# Patient Record
Sex: MALE | Race: WHITE | URBAN - METROPOLITAN AREA
[De-identification: names, ages, dates, MRNs, and addresses within clinical notes are randomized per-mention and may not be internally consistent; named-entity substitution may affect disease eponyms.]

---

## 2021-06-29 ENCOUNTER — HOSPITAL ENCOUNTER (OUTPATIENT)
Facility: CLINIC | Age: 56
End: 2021-06-29
Attending: OPHTHALMOLOGY | Admitting: OPHTHALMOLOGY
Payer: OTHER MISCELLANEOUS

## 2021-06-29 ENCOUNTER — HOSPITAL ENCOUNTER (EMERGENCY)
Facility: CLINIC | Age: 56
End: 2021-06-29

## 2021-06-29 ENCOUNTER — HOSPITAL ENCOUNTER (OUTPATIENT)
Facility: CLINIC | Age: 56
Discharge: HOME | End: 2021-06-30
Attending: OPHTHALMOLOGY | Admitting: OPHTHALMOLOGY
Payer: OTHER MISCELLANEOUS

## 2021-06-29 ENCOUNTER — ANESTHESIA EVENT (OUTPATIENT)
Dept: SURGERY | Facility: CLINIC | Age: 56
End: 2021-06-29
Payer: OTHER MISCELLANEOUS

## 2021-06-29 ENCOUNTER — ANESTHESIA (OUTPATIENT)
Dept: SURGERY | Facility: CLINIC | Age: 56
End: 2021-06-29
Payer: OTHER MISCELLANEOUS

## 2021-06-29 DIAGNOSIS — S05.31XA RUPTURED GLOBE OF RIGHT EYE, INITIAL ENCOUNTER: Primary | ICD-10-CM

## 2021-06-29 DIAGNOSIS — S05.30XA RUPTURED GLOBE: ICD-10-CM

## 2021-06-29 DIAGNOSIS — S05.50XA: ICD-10-CM

## 2021-06-29 DIAGNOSIS — S05.51XA INTRAOCULAR FOREIGN BODY OF RIGHT EYE, INITIAL ENCOUNTER: ICD-10-CM

## 2021-06-29 LAB
LABORATORY COMMENT REPORT: NORMAL
SARS-COV-2 RNA RESP QL NAA+PROBE: NEGATIVE
SPECIMEN SOURCE: NORMAL

## 2021-06-29 PROCEDURE — 87635 SARS-COV-2 COVID-19 AMP PRB: CPT | Performed by: STUDENT IN AN ORGANIZED HEALTH CARE EDUCATION/TRAINING PROGRAM

## 2021-06-29 DEVICE — EYE IMP SLEEVE OVAL STYLE 3084 S3084: Type: IMPLANTABLE DEVICE | Site: EYE | Status: FUNCTIONAL

## 2021-06-29 RX ORDER — CYCLOPENTOLAT/TROPIC/PHENYLEPH 1%-1%-2.5%
1 DROPS (EA) OPHTHALMIC (EYE)
Status: DISCONTINUED | OUTPATIENT
Start: 2021-06-29 | End: 2021-06-30 | Stop reason: HOSPADM

## 2021-06-29 ASSESSMENT — MIFFLIN-ST. JEOR: SCORE: 2238

## 2021-06-30 ENCOUNTER — OFFICE VISIT (OUTPATIENT)
Dept: OPHTHALMOLOGY | Facility: CLINIC | Age: 56
End: 2021-06-30
Attending: OPHTHALMOLOGY
Payer: OTHER MISCELLANEOUS

## 2021-06-30 VITALS
SYSTOLIC BLOOD PRESSURE: 130 MMHG | TEMPERATURE: 98.2 F | DIASTOLIC BLOOD PRESSURE: 75 MMHG | HEART RATE: 92 BPM | BODY MASS INDEX: 40.61 KG/M2 | HEIGHT: 72 IN | WEIGHT: 299.83 LBS | OXYGEN SATURATION: 93 % | RESPIRATION RATE: 12 BRPM

## 2021-06-30 DIAGNOSIS — Z48.810 AFTERCARE FOLLOWING SURGERY OF A SENSE ORGAN: Primary | ICD-10-CM

## 2021-06-30 PROCEDURE — 250N000013 HC RX MED GY IP 250 OP 250 PS 637: Performed by: ANESTHESIOLOGY

## 2021-06-30 PROCEDURE — 250N000009 HC RX 250: Performed by: NURSE ANESTHETIST, CERTIFIED REGISTERED

## 2021-06-30 PROCEDURE — 370N000017 HC ANESTHESIA TECHNICAL FEE, PER MIN: Performed by: OPHTHALMOLOGY

## 2021-06-30 PROCEDURE — 258N000003 HC RX IP 258 OP 636: Performed by: NURSE ANESTHETIST, CERTIFIED REGISTERED

## 2021-06-30 PROCEDURE — 250N000025 HC SEVOFLURANE, PER MIN: Performed by: OPHTHALMOLOGY

## 2021-06-30 PROCEDURE — 710N000010 HC RECOVERY PHASE 1, LEVEL 2, PER MIN: Performed by: OPHTHALMOLOGY

## 2021-06-30 PROCEDURE — 272N000001 HC OR GENERAL SUPPLY STERILE: Performed by: OPHTHALMOLOGY

## 2021-06-30 PROCEDURE — 710N000012 HC RECOVERY PHASE 2, PER MINUTE: Performed by: OPHTHALMOLOGY

## 2021-06-30 PROCEDURE — G0463 HOSPITAL OUTPT CLINIC VISIT: HCPCS

## 2021-06-30 PROCEDURE — 360N000077 HC SURGERY LEVEL 4, PER MIN: Performed by: OPHTHALMOLOGY

## 2021-06-30 PROCEDURE — 99024 POSTOP FOLLOW-UP VISIT: CPT | Performed by: OPHTHALMOLOGY

## 2021-06-30 PROCEDURE — C1784 OCULAR DEV, INTRAOP, DET RET: HCPCS | Performed by: OPHTHALMOLOGY

## 2021-06-30 PROCEDURE — 250N000009 HC RX 250: Performed by: OPHTHALMOLOGY

## 2021-06-30 PROCEDURE — 250N000011 HC RX IP 250 OP 636: Performed by: OPHTHALMOLOGY

## 2021-06-30 PROCEDURE — 87070 CULTURE OTHR SPECIMN AEROBIC: CPT | Performed by: OPHTHALMOLOGY

## 2021-06-30 PROCEDURE — 250N000011 HC RX IP 250 OP 636: Performed by: ANESTHESIOLOGY

## 2021-06-30 PROCEDURE — 999N000141 HC STATISTIC PRE-PROCEDURE NURSING ASSESSMENT: Performed by: OPHTHALMOLOGY

## 2021-06-30 PROCEDURE — 272N000002 HC OR SUPPLY OTHER OPNP: Performed by: OPHTHALMOLOGY

## 2021-06-30 PROCEDURE — 250N000011 HC RX IP 250 OP 636: Performed by: NURSE ANESTHETIST, CERTIFIED REGISTERED

## 2021-06-30 DEVICE — EYE IMP STRIP STYLE 4050 S4050: Type: IMPLANTABLE DEVICE | Site: EYE | Status: FUNCTIONAL

## 2021-06-30 RX ORDER — DEXAMETHASONE SODIUM PHOSPHATE 4 MG/ML
INJECTION, SOLUTION INTRA-ARTICULAR; INTRALESIONAL; INTRAMUSCULAR; INTRAVENOUS; SOFT TISSUE PRN
Status: DISCONTINUED | OUTPATIENT
Start: 2021-06-30 | End: 2021-06-30

## 2021-06-30 RX ORDER — NALOXONE HYDROCHLORIDE 0.4 MG/ML
0.4 INJECTION, SOLUTION INTRAMUSCULAR; INTRAVENOUS; SUBCUTANEOUS
Status: DISCONTINUED | OUTPATIENT
Start: 2021-06-30 | End: 2021-06-30 | Stop reason: HOSPADM

## 2021-06-30 RX ORDER — ONDANSETRON 2 MG/ML
4 INJECTION INTRAMUSCULAR; INTRAVENOUS EVERY 30 MIN PRN
Status: DISCONTINUED | OUTPATIENT
Start: 2021-06-30 | End: 2021-06-30 | Stop reason: HOSPADM

## 2021-06-30 RX ORDER — TRIAMCINOLONE ACETONIDE 40 MG/ML
INJECTION, SUSPENSION INTRA-ARTICULAR; INTRAMUSCULAR PRN
Status: DISCONTINUED | OUTPATIENT
Start: 2021-06-30 | End: 2021-06-30 | Stop reason: HOSPADM

## 2021-06-30 RX ORDER — ATROPINE SULFATE 10 MG/ML
SOLUTION/ DROPS OPHTHALMIC PRN
Status: DISCONTINUED | OUTPATIENT
Start: 2021-06-30 | End: 2021-06-30 | Stop reason: HOSPADM

## 2021-06-30 RX ORDER — ONDANSETRON 4 MG/1
4 TABLET, ORALLY DISINTEGRATING ORAL EVERY 30 MIN PRN
Status: DISCONTINUED | OUTPATIENT
Start: 2021-06-30 | End: 2021-06-30 | Stop reason: HOSPADM

## 2021-06-30 RX ORDER — SODIUM CHLORIDE, SODIUM LACTATE, POTASSIUM CHLORIDE, CALCIUM CHLORIDE 600; 310; 30; 20 MG/100ML; MG/100ML; MG/100ML; MG/100ML
INJECTION, SOLUTION INTRAVENOUS CONTINUOUS PRN
Status: DISCONTINUED | OUTPATIENT
Start: 2021-06-30 | End: 2021-06-30

## 2021-06-30 RX ORDER — HYDRALAZINE HYDROCHLORIDE 20 MG/ML
2.5-5 INJECTION INTRAMUSCULAR; INTRAVENOUS EVERY 10 MIN PRN
Status: DISCONTINUED | OUTPATIENT
Start: 2021-06-30 | End: 2021-06-30 | Stop reason: HOSPADM

## 2021-06-30 RX ORDER — PREDNISOLONE ACETATE 10 MG/ML
1-2 SUSPENSION/ DROPS OPHTHALMIC 4 TIMES DAILY
Qty: 10 ML | Refills: 1 | Status: SHIPPED | OUTPATIENT
Start: 2021-06-30

## 2021-06-30 RX ORDER — ACETAMINOPHEN 325 MG/1
975 TABLET ORAL ONCE
Status: COMPLETED | OUTPATIENT
Start: 2021-06-30 | End: 2021-06-30

## 2021-06-30 RX ORDER — PREDNISOLONE ACETATE 10 MG/ML
1 SUSPENSION/ DROPS OPHTHALMIC 4 TIMES DAILY
Qty: 5 ML | Refills: 0 | OUTPATIENT
Start: 2021-06-30 | End: 2024-08-08

## 2021-06-30 RX ORDER — DIMENHYDRINATE 50 MG/ML
25 INJECTION, SOLUTION INTRAMUSCULAR; INTRAVENOUS
Status: DISCONTINUED | OUTPATIENT
Start: 2021-06-30 | End: 2021-06-30 | Stop reason: HOSPADM

## 2021-06-30 RX ORDER — SODIUM CHLORIDE, SODIUM LACTATE, POTASSIUM CHLORIDE, CALCIUM CHLORIDE 600; 310; 30; 20 MG/100ML; MG/100ML; MG/100ML; MG/100ML
INJECTION, SOLUTION INTRAVENOUS CONTINUOUS
Status: DISCONTINUED | OUTPATIENT
Start: 2021-06-30 | End: 2021-06-30 | Stop reason: HOSPADM

## 2021-06-30 RX ORDER — NEOMYCIN SULFATE, POLYMYXIN B SULFATE, AND DEXAMETHASONE 3.5; 10000; 1 MG/G; [USP'U]/G; MG/G
OINTMENT OPHTHALMIC PRN
Status: DISCONTINUED | OUTPATIENT
Start: 2021-06-30 | End: 2021-06-30 | Stop reason: HOSPADM

## 2021-06-30 RX ORDER — BALANCED SALT SOLUTION 6.4; .75; .48; .3; 3.9; 1.7 MG/ML; MG/ML; MG/ML; MG/ML; MG/ML; MG/ML
SOLUTION OPHTHALMIC PRN
Status: DISCONTINUED | OUTPATIENT
Start: 2021-06-30 | End: 2021-06-30 | Stop reason: HOSPADM

## 2021-06-30 RX ORDER — FENTANYL CITRATE 50 UG/ML
INJECTION, SOLUTION INTRAMUSCULAR; INTRAVENOUS PRN
Status: DISCONTINUED | OUTPATIENT
Start: 2021-06-30 | End: 2021-06-30

## 2021-06-30 RX ORDER — OFLOXACIN 3 MG/ML
SOLUTION AURICULAR (OTIC) PRN
Status: DISCONTINUED | OUTPATIENT
Start: 2021-06-30 | End: 2021-06-30 | Stop reason: HOSPADM

## 2021-06-30 RX ORDER — NALOXONE HYDROCHLORIDE 0.4 MG/ML
0.2 INJECTION, SOLUTION INTRAMUSCULAR; INTRAVENOUS; SUBCUTANEOUS
Status: DISCONTINUED | OUTPATIENT
Start: 2021-06-30 | End: 2021-06-30 | Stop reason: HOSPADM

## 2021-06-30 RX ORDER — MEPERIDINE HYDROCHLORIDE 25 MG/ML
12.5 INJECTION INTRAMUSCULAR; INTRAVENOUS; SUBCUTANEOUS
Status: DISCONTINUED | OUTPATIENT
Start: 2021-06-30 | End: 2021-06-30 | Stop reason: HOSPADM

## 2021-06-30 RX ORDER — POLYMYXIN B SULFATE AND TRIMETHOPRIM 10000; 1 1/ML; MG/ML
SOLUTION OPHTHALMIC PRN
Status: DISCONTINUED | OUTPATIENT
Start: 2021-06-30 | End: 2021-06-30 | Stop reason: HOSPADM

## 2021-06-30 RX ORDER — FENTANYL CITRATE 50 UG/ML
25-50 INJECTION, SOLUTION INTRAMUSCULAR; INTRAVENOUS
Status: DISCONTINUED | OUTPATIENT
Start: 2021-06-30 | End: 2021-06-30 | Stop reason: HOSPADM

## 2021-06-30 RX ORDER — HYDROMORPHONE HYDROCHLORIDE 1 MG/ML
.3-.5 INJECTION, SOLUTION INTRAMUSCULAR; INTRAVENOUS; SUBCUTANEOUS EVERY 10 MIN PRN
Status: DISCONTINUED | OUTPATIENT
Start: 2021-06-30 | End: 2021-06-30 | Stop reason: HOSPADM

## 2021-06-30 RX ORDER — PROPOFOL 10 MG/ML
INJECTION, EMULSION INTRAVENOUS PRN
Status: DISCONTINUED | OUTPATIENT
Start: 2021-06-30 | End: 2021-06-30

## 2021-06-30 RX ORDER — TIMOLOL MALEATE 5 MG/ML
SOLUTION/ DROPS OPHTHALMIC PRN
Status: DISCONTINUED | OUTPATIENT
Start: 2021-06-30 | End: 2021-06-30 | Stop reason: HOSPADM

## 2021-06-30 RX ORDER — DEXAMETHASONE SODIUM PHOSPHATE 4 MG/ML
INJECTION, SOLUTION INTRA-ARTICULAR; INTRALESIONAL; INTRAMUSCULAR; INTRAVENOUS; SOFT TISSUE PRN
Status: DISCONTINUED | OUTPATIENT
Start: 2021-06-30 | End: 2021-06-30 | Stop reason: HOSPADM

## 2021-06-30 RX ORDER — LIDOCAINE HYDROCHLORIDE 20 MG/ML
INJECTION, SOLUTION INFILTRATION; PERINEURAL PRN
Status: DISCONTINUED | OUTPATIENT
Start: 2021-06-30 | End: 2021-06-30

## 2021-06-30 RX ORDER — OFLOXACIN 3 MG/ML
1 SOLUTION/ DROPS OPHTHALMIC 4 TIMES DAILY
Qty: 5 ML | Refills: 0 | OUTPATIENT
Start: 2021-06-30 | End: 2024-08-08

## 2021-06-30 RX ADMIN — HYDROMORPHONE HYDROCHLORIDE 0.5 MG: 1 INJECTION, SOLUTION INTRAMUSCULAR; INTRAVENOUS; SUBCUTANEOUS at 01:49

## 2021-06-30 RX ADMIN — FENTANYL CITRATE 50 MCG: 50 INJECTION, SOLUTION INTRAMUSCULAR; INTRAVENOUS at 00:48

## 2021-06-30 RX ADMIN — SODIUM CHLORIDE, POTASSIUM CHLORIDE, SODIUM LACTATE AND CALCIUM CHLORIDE: 600; 310; 30; 20 INJECTION, SOLUTION INTRAVENOUS at 00:48

## 2021-06-30 RX ADMIN — DEXAMETHASONE SODIUM PHOSPHATE 4 MG: 4 INJECTION, SOLUTION INTRAMUSCULAR; INTRAVENOUS at 01:15

## 2021-06-30 RX ADMIN — ROCURONIUM BROMIDE 20 MG: 10 INJECTION INTRAVENOUS at 01:24

## 2021-06-30 RX ADMIN — SUGAMMADEX 200 MG: 100 INJECTION, SOLUTION INTRAVENOUS at 03:50

## 2021-06-30 RX ADMIN — LIDOCAINE HYDROCHLORIDE 100 MG: 20 INJECTION, SOLUTION INFILTRATION; PERINEURAL at 00:48

## 2021-06-30 RX ADMIN — ACETAMINOPHEN 975 MG: 325 TABLET, FILM COATED ORAL at 04:52

## 2021-06-30 RX ADMIN — MIDAZOLAM 2 MG: 1 INJECTION INTRAMUSCULAR; INTRAVENOUS at 00:43

## 2021-06-30 RX ADMIN — PROPOFOL 300 MG: 10 INJECTION, EMULSION INTRAVENOUS at 00:48

## 2021-06-30 RX ADMIN — FENTANYL CITRATE 50 MCG: 50 INJECTION, SOLUTION INTRAMUSCULAR; INTRAVENOUS at 01:17

## 2021-06-30 RX ADMIN — HYDROMORPHONE HYDROCHLORIDE 0.5 MG: 1 INJECTION, SOLUTION INTRAMUSCULAR; INTRAVENOUS; SUBCUTANEOUS at 01:30

## 2021-06-30 RX ADMIN — FENTANYL CITRATE 50 MCG: 50 INJECTION, SOLUTION INTRAMUSCULAR; INTRAVENOUS at 01:25

## 2021-06-30 RX ADMIN — ROCURONIUM BROMIDE 50 MG: 10 INJECTION INTRAVENOUS at 00:48

## 2021-06-30 RX ADMIN — ONDANSETRON 4 MG: 2 INJECTION INTRAMUSCULAR; INTRAVENOUS at 04:49

## 2021-06-30 RX ADMIN — FENTANYL CITRATE 50 MCG: 50 INJECTION, SOLUTION INTRAMUSCULAR; INTRAVENOUS at 01:37

## 2021-06-30 ASSESSMENT — VISUAL ACUITY
OS_CC: 20/20
OS_CC+: -1
OD_CC: HM
METHOD: SNELLEN - LINEAR

## 2021-06-30 ASSESSMENT — TONOMETRY
IOP_METHOD: TONOPEN
OS_IOP_MMHG: 18
OD_IOP_MMHG: 21

## 2021-06-30 ASSESSMENT — CUP TO DISC RATIO: OS_RATIO: 0.45

## 2021-06-30 ASSESSMENT — EXTERNAL EXAM - RIGHT EYE: OD_EXAM: NORMAL

## 2021-06-30 ASSESSMENT — EXTERNAL EXAM - LEFT EYE: OS_EXAM: NORMAL

## 2021-06-30 ASSESSMENT — SLIT LAMP EXAM - LIDS: COMMENTS: NORMAL

## 2021-06-30 NOTE — ANESTHESIA CARE TRANSFER NOTE
Patient: Qasim Owusu    Procedure(s):  REPAIR, RUPTURE, GLOBE  VITRECTOMY, PARS PLANA APPROACH, USING 23-GAUGE INSTRUMENTS; ENDOLASER, CRYO, SF6 GAS  Remove foreign body intraocular  Washout, Eye, Anterior Chamber    Diagnosis: Ruptured globe [S05.30XA]  Intraocular foreign body [S05.50XA]  Diagnosis Additional Information: No value filed.    Anesthesia Type:   General     Note:    Oropharynx: oropharynx clear of all foreign objects and spontaneously breathing  Level of Consciousness: awake  Oxygen Supplementation: face mask  Level of Supplemental Oxygen (L/min / FiO2): 6  Independent Airway: airway patency satisfactory and stable  Dentition: dentition unchanged  Vital Signs Stable: post-procedure vital signs reviewed and stable  Report to RN Given: handoff report given  Patient transferred to: PACU  Comments: .Anesthesia Care Transfer Note    Patient: Qasim Owusu    Transferred to: PACU    Patient vital signs: stable    Airway: none    Monitors applied, VSS.  Patient awake and comfortable, breathing spontaneously.  Report given to RN with transfer of care.        Shama Diggs CRNA  6/30/2021  4:13 AM    Handoff Report: Identifed the Patient, Identified the Reponsible Provider, Reviewed the pertinent medical history, Discussed the surgical course, Reviewed Intra-OP anesthesia mangement and issues during anesthesia, Set expectations for post-procedure period and Allowed opportunity for questions and acknowledgement of understanding      Vitals: (Last set prior to Anesthesia Care Transfer)  CRNA VITALS  6/30/2021 0334 - 6/30/2021 0413      6/30/2021             Pulse:  107    SpO2:  99 %        Electronically Signed By: ROBBIE Du CRNA  June 30, 2021  4:13 AM

## 2021-06-30 NOTE — ANESTHESIA PROCEDURE NOTES
Airway       Patient location during procedure: OR  Staff -        CRNA: Nesha Rivas APRN CRNA       Performed By: CRNA  Consent for Airway        Urgency: elective  Indications and Patient Condition       Indications for airway management: abdirahman-procedural       Induction type:intravenous       Mask difficulty assessment: 2 - vent by mask + OA or adjuvant +/- NMBA    Final Airway Details       Final airway type: endotracheal airway       Successful airway: ETT - single  Endotracheal Airway Details        ETT size (mm): 8.0       Cuffed: yes       Successful intubation technique: video laryngoscopy       VL Blade Size: MAC 3 (very anterior, CMAC of great benefit)       Grade View of Cords: 2       Adjucts: stylet       Position: Right       Measured from: gums/teeth       Secured at (cm): 24       Bite block used: None    Post intubation assessment        Placement verified by: capnometry and equal breath sounds        Number of attempts at approach: 1       Secured with: silk tape       Ease of procedure: easy       Dentition: Intact and Unchanged    Medication(s) Administered   Medication Administration Time: 6/30/2021 1:04 AM

## 2021-06-30 NOTE — OP NOTE
SURGEON: Real Leroy MD  ASSISTANT SURGEON: Anthony Andrade MD; Erica Castillo MD    PREOPERATIVE DIAGNOSIS: Traumatic globe rupture and intraocular foreign body Right eye   POSTOPERATIVE DIAGNOSIS: same     NAME OF THE PROCEDURE:   25 gauge pars plana vitectomy, intraocular foreign body removal, anterior chamber washout, endolaser, cryotherapy, air-fluid exchange, SF6 gas 25%     ANESTHESIA: General anesthesia and peribulbar block right eye      COMPLICATIONS: none   INDICATIONS: Qasim Owusu is a 55 year old patient who was hammering earlier this afternoon (5/29/21) when he felt something strike his right eye. He noted blurred vision that he described as a cloud. He denied flashing lights or curtain sensation. He presented to local ED in [place], where a CT scan was obtained and IOFB was diagnosed. Patient drove to us for surgical intervention. Risks (including risks of infection, retinal detachment, cataract, need for multiple surgeries), benefits and need for urgent intervention was extensively discussed with patient in the preoperative area.  Presenting vision was 20/200, IOP was wnl. Examination revealed right eye perforating injury to lower eyelid and subconj hemorrhage infratemporally. AC was formed with mild hyphema and a mild-mod age related cataract changes without traumatic cataract. Vitreous hemorrhage and metallic IOFB in vit cavity and temporal impact site with retina tear were noted.   Left eye exam revealed normal eye exam with mild cataract changes and 0.45 c/d.   DESCRIPTION OF THE PROCEDURE   The patient was brought into the OR where general anesthesia was administeredby the anesthesia department.   The eye was then prepped and draped in the usual fashion for ophthalmic surgery, including the installation of one drop of povidone iodine   A 360 degree conjunctival peritomy was created and the inferotemporal quadrant was cleared with the chambers scissors. A linear 2mm scleral  laceration was noted starting anteriorly and extending lateral rectus. Lateral rectus was isolated with muscle hook and the laceration was noted to not involve the muscle or posterior to the muscle. The scleral laceration was closed with 8-0 vicryl and found to be water-tight.   A paracentesis was made and anterior chamber wash-out performed to clear mild hyphema.   Attention was then turned to the vitrectomy. Marks were made on the sclera inferotemporally, superotemporally, and superonasally 3.5 mm posterior to the limbus. The 25g transscleral cannulas were inserted through the sclera using the trocars. The infusion cannula was connected inferotemporally and directly visualized to verify it was in the correct location. The vitrector handpiece and endoilluiminator were placed in the eye. Upon entering the eye it was noted that the patient had a vitreous hemorrhage. There was an intraocular foreign body that hit the retina temporally causing a small retina tear without significant detachment or hemorrhage.    Next, a pars plana vitrectomy (PPV) was performed and IOFB was isolated. Peripheral vitrectomy was assisted with scleral depression and instillation of kenalog for visualization. A Posterior vitreous detachment (PVD) was created with the vitrector in suction mode. The PVD was extended past the impact site. It was noted the intraocular foreign body appeared to be metal of approximately 3x2  mm. We placed some PFO to protect the macula.     We made a superior sclertomy parallel to the limbus 3mm from the 12 o'clock limbus and a length of 5mm with a keratome and supershap blade.   The intraocular magnet was used to remove the intraocular foreign body. The retina remained attached. PFO was removed with soft tip. Additional vitrectomy was performed and vitreous base and ora bindu were examined. IOFB entered the eye from pars plana temporally. Endolaser was applied to retina impact site and two other areas with  retinal whitening but no retinal tear superiorly. Cryotherapy was applied temporally, posterior to site of scleral laceration. Next an air fluid exchange was performed using a soft-tip cannula and the light pipe. The superior nasal sclerotomy was closed with 8-0 vicryl suture and 8-0 nylon.   Air-gas exchange was performed with 25% SF6 gas.   The sclerotomies were sutured with 6-0 plain gut suture and were airtight. The pressure was checked and verified to be appropriate.The conjunctiva was closed with 6-0 plain suture. A peribulbar block consistent of a 1:1 mixtures of 2% Lidocaine and 0.75 % of marcaine with epinephrine and wydase was administered.Subconjunctival injections of dexamethasone and ancef was administered.  The lid speculum was removed. The eye was cleaned with wet and dry gauze. A drop of atropine, timolol, alphagan and maxitrol ointment was placed in the eye. An eye pad and hwang shield were taped over the eye. The patient tolerated well the procedure and was discharge to the post-operative unit in stable conditions with no complications.     The surgery was assisted by Anthony Andrade MD, because no qualified resident was available to assist on the day of surgery.  Due to the delicate and complex nature of this surgery, a skilled assistant like Dr. Andrade was required.  Dr. Andrade and Dr. Castillo assisted with globe rupture repair. Dr. Andrade further helped with pars plana vitrectomy, IOFB removal, laser, AFx, and gas placement.  I was present for the entire surgery.

## 2021-06-30 NOTE — ANESTHESIA PREPROCEDURE EVALUATION
Anesthesia Pre-Procedure Evaluation    Patient: Qasim Owusu   MRN: [id number] : 1965        Preoperative Diagnosis: Ruptured globe [S05.30XA]  Intraocular foreign body [S05.50XA]   Procedure : Procedure(s):  REPAIR, RUPTURE, GLOBE  VITRECTOMY, PARS PLANA APPROACH, USING 23-GAUGE INSTRUMENTS     No past medical history on file.   No past surgical history on file.   Allergies   Allergen Reactions     Penicillins Hives and Itching     Swelling        Social History     Tobacco Use     Smoking status: Not on file   Substance Use Topics     Alcohol use: Not on file      Wt Readings from Last 1 Encounters:   21 136 kg (299 lb 13.2 oz)        Anesthesia Evaluation   Pt has had prior anesthetic. Type: General.        ROS/MED HX  ENT/Pulmonary:     (+) sleep apnea, moderate, uses CPAP, JOHN risk factors, hypertension, obese, observed stopped breathing,     Neurologic:  - neg neurologic ROS     Cardiovascular:     (+) hypertension-----    METS/Exercise Tolerance:     Hematologic:       Musculoskeletal:       GI/Hepatic:       Renal/Genitourinary:       Endo:     (+) Obesity,     Psychiatric/Substance Use:     (+) H/O chronic opiod use .     Infectious Disease:       Malignancy:       Other: Comment: dorsal column stimulator   hernia repairs  Rotator cuff repair.  3 back surgeries.     (+) , H/O Chronic Pain,        Physical Exam    Airway        Mallampati: II   TM distance: < 3 FB    Mouth opening: > 3 cm    Respiratory Devices and Support         Dental         B=Bridge, C=Chipped, L=Loose, M=Missing    Cardiovascular          Rhythm and rate: regular and normal     Pulmonary           breath sounds clear to auscultation           OUTSIDE LABS:  CBC: No results found for: WBC, HGB, HCT, PLT  BMP: No results found for: NA, POTASSIUM, CHLORIDE, CO2, BUN, CR, GLC  COAGS: No results found for: PTT, INR, FIBR  POC: No results found for: BGM, HCG, HCGS  HEPATIC: No results found for: ALBUMIN, PROTTOTAL, ALT, AST,  GGT, ALKPHOS, BILITOTAL, BILIDIRECT, RALF  OTHER: No results found for: PH, LACT, A1C, SAM, PHOS, MAG, LIPASE, AMYLASE, TSH, T4, T3, CRP, SED    Anesthesia Plan    ASA Status:  2, emergent    NPO Status:  NPO Appropriate    Anesthesia Type: General.     - Airway: ETT   Induction: Intravenous.   Maintenance: Balanced.   Techniques and Equipment:     - Airway: Video-Laryngoscope         Consents    Anesthesia Plan(s) and associated risks, benefits, and realistic alternatives discussed. Questions answered and patient/representative(s) expressed understanding.     - Discussed with:  Patient      - Specific Concerns: PONV sore throat, cardiac, respiratory event.     - Extended Intubation/Ventilatory Support Discussed: No.      - Patient is DNR/DNI Status: No    Use of blood products discussed: No .     Postoperative Care    Pain management: IV analgesics, Oral pain medications.   PONV prophylaxis: Ondansetron (or other 5HT-3)     Comments:    geta  Standard monitors       H&P reviewed: Unable to attach H&P to encounter due to EHR limitations. H&P Update: appropriate H&P reviewed, patient examined. No interval changes since H&P (within 30 days).         Maria Esther Betancourt MD

## 2021-06-30 NOTE — PROGRESS NOTES
Postoperative day 1 status post Rupture globe repair/IOFB removal/SF6 gas right eye.     HPI: Qasim Owusu is a 55 year old patient who presented from [place], [place] after he felt a piece of metal strike his right eye while hammering 5/29/21. He noticed a blurring of vision and presented to local ED, before driving down for surgery.     Interval history: denies eye pain.      Slept well  Retina attached  Doing well    Not on back positioning.     Plan:    # POD 1 s/p Rupture globe repair/IOFB removal/SF6 gas right eye.     Position: Not on back; sleep to your left  No aviation  No heavy lifting   Garcia shield at all times  Retina detachment and endophthalmitis precautions were discussed with the patient and was asked to return if any of the those occur    Medications to operative eye  Prednisolone four times daily right eye  Ofloxacin (beige cap) four times daily right eye  Atropine () once at night right eye  Brimonidine (purple cap) two times a day right eye  Timolol (yellow cap) two times a day right eye  Ophthalmic ointment maxitrol at night right eye     Follow up in one week  ~~~~~~~~~~~~~~~~~~~~~~~~~~~~~~~~~~   Complete documentation of historical and exam elements from today's encounter can be found in the full encounter summary report (not reduplicated in this progress note).  I personally obtained the chief complaint(s) and history of present illness.  I confirmed and edited as necessary the review of systems, past medical/surgical history, family history, social history, and examination findings as documented by others; and I examined the patient myself.  I personally reviewed the relevant tests, images, and reports as documented above.  I formulated and edited as necessary the assessment and plan and discussed the findings and management plan with the patient and family    Real Leroy MD, PhD  , Vitreoretinal Surgery  Department of Ophthalmology  [facility]  [place]       No

## 2021-06-30 NOTE — BRIEF OP NOTE
[facility]    Brief Operative Note    Pre-operative diagnosis: Ruptured globe [S05.30XA]  Intraocular foreign body [S05.50XA] right eye  Post-operative diagnosis Same as pre-operative diagnosis + retinal tear right eye    Procedure: Procedure(s):  REPAIR, RUPTURE, GLOBE  VITRECTOMY, PARS PLANA APPROACH, USING 23-GAUGE INSTRUMENTS; ENDOLASER, CRYO, SF6 GAS  Remove foreign body intraocular  Washout, Eye, Anterior Chamber  Surgeon: Surgeon(s) and Role:        * Real Noel MD - Primary    * Anthony Andrade MD - Fellow - Assisting     * Erica Castillo MD - Resident - Assisting  Anesthesia: General   Estimated blood loss: Minimal  Drains: None  Specimens:   ID Type Source Tests Collected by Time Destination   1 : INTROCULAR FOREIGN BODY Other (specify in comments) Other MISCELLANEOUS CULTURE AEROBIC BACTERIAL Anthony Andrade MD 6/30/2021  3:37 AM      Findings:   intraocular metallic foreign body and ruptured globe.  Complications: None.  Implants:   Implant Name Type Inv. Item Serial No.  Lot No. LRB No. Used Action   EYE IMP STRIP GLENDY [id number]  - [id number] Lens/Eye Implant EYE IMP STRIP GLENDY [id number]   [place] [employer] [id number] Right 1 Implanted   EYE IMP FASTENER SLEEVE OVAL  - [id number] Lens/Eye Implant EYE IMP FASTENER SLEEVE OVAL   [place] [employer] [id number] Right 1 Implanted

## 2021-06-30 NOTE — NURSING NOTE
Chief Complaints and History of Present Illnesses   Patient presents with     Post Op (Ophthalmology) Right Eye     Chief Complaint(s) and History of Present Illness(es)     Post Op (Ophthalmology) Right Eye     Laterality: right eye              Comments     1 day post op REPAIR, RUPTURE, GLOBE Right General  VITRECTOMY, PARS PLANA APPROACH, USING 23-GAUGE INSTRUMENTS; ENDOLASER, CRYO, SF6 GAS  Wasn't able sleep got done w surgery around 4:30 am,  no pain  Edilma NUNN 8:52 AM June 30, 2021

## 2021-06-30 NOTE — ANESTHESIA POSTPROCEDURE EVALUATION
Patient: Qasim Owusu    Procedure(s):  REPAIR, RUPTURE, GLOBE  VITRECTOMY, PARS PLANA APPROACH, USING 23-GAUGE INSTRUMENTS; ENDOLASER, CRYO, SF6 GAS  Remove foreign body intraocular  Washout, Eye, Anterior Chamber    Diagnosis:Ruptured globe [S05.30XA]  Intraocular foreign body [S05.50XA]  Diagnosis Additional Information: No value filed.    Anesthesia Type:  General    Note:  Disposition: Outpatient   Postop Pain Control: Uneventful            Sign Out: Well controlled pain   PONV: No   Neuro/Psych: Uneventful            Sign Out: Acceptable/Baseline neuro status   Airway/Respiratory: Uneventful            Sign Out: Acceptable/Baseline resp. status   CV/Hemodynamics: Uneventful            Sign Out: Acceptable CV status; No obvious hypovolemia; No obvious fluid overload   Other NRE: NONE   DID A NON-ROUTINE EVENT OCCUR? No           Last vitals:  Vitals:    06/30/21 0430 06/30/21 0445 06/30/21 0510   BP: (!) 132/104 (!) 124/90 130/75   Pulse: 92     Resp: 14 12 12   Temp: 36.8  C (98.2  F)  36.8  C (98.2  F)   SpO2: 93% 93% 93%       Last vitals prior to Anesthesia Care Transfer:  CRNA VITALS  6/30/2021 0334 - 6/30/2021 0434      6/30/2021             NIBP:  (!) 120/103    Ht Rate:  99          Electronically Signed By: Maria Esther Betancourt MD  June 30, 2021  5:27 AM

## 2021-06-30 NOTE — OR NURSING
Notified Dr. Betancourt that patient meets criteria for discharge. Ok to discharge per Dr. Betancourt.

## 2021-06-30 NOTE — DISCHARGE INSTRUCTIONS
Same-Day Surgery   Adult Discharge Orders & Instructions     For 24 hours after surgery:  1. Get plenty of rest.  A responsible adult must stay with you for at least 24 hours after you leave the hospital.   2. Pain medication can slow your reflexes. Do not drive or use heavy equipment.  If you have weakness or tingling, don't drive or use heavy equipment until this feeling goes away.  3. Mixing alcohol and pain medication can cause dizziness and slow your breathing. It can even be fatal. Do not drink alcohol while taking pain medication.  4. Avoid strenuous or risky activities.  Ask for help when climbing stairs.   5. You may feel lightheaded.  If so, sit for a few minutes before standing.  Have someone help you get up.   6. If you have nausea (feel sick to your stomach), drink only clear liquids such as apple juice, ginger ale, broth or 7-Up.  Rest may also help.  Be sure to drink enough fluids.  Move to a regular diet as you feel able. Take pain medications with a small amount of solid food, such as toast or crackers, to avoid nausea.   7. A slight fever is normal. Call the doctor if your fever is over 100 F (37.7 C) (taken under the tongue) or lasts longer than 24 hours.  8. You may have a dry mouth, muscle aches, trouble sleeping or a sore throat.  These symptoms should go away after 24 hours.  9. Do not make important or legal decisions.   Pain Management:      1. Take pain medication (if prescribed) for pain as directed by your physician.        2. WARNING: If the pain medication you have been prescribed contains Tylenol  (acetaminophen), DO NOT take additional doses of Tylenol (acetaminophen).     Call your doctor for any of the followin.  Signs of infection (fever, growing tenderness at the surgery site, severe pain, a large amount of drainage or bleeding, foul-smelling drainage, redness, swelling).    2.  It has been over 8 to 10 hours since surgery and you are still not able to urinate (pee).    3.   Headache for over 24 hours.    4.  Numbness, tingling or weakness the day after surgery (if you had spinal anesthesia).  To contact a doctor, call _____________________________________ or:      [phone] and ask for the Resident On Call for:          __________________________________________ (answered 24 hours a day)      Emergency Department:  [place] [facility]: [phone]  [place] [facility]: [phone]               Rev. 10/2014

## 2021-06-30 NOTE — PATIENT INSTRUCTIONS
Prednisolone four times daily right eye    Ofloxacin (beige cap) four times daily right eye    Atropine () once at night right eye    Brimonidine (purple cap) two times a day right eye    Timolol (yellow cap) two times a day right eye    Ophthalmic ointment maxitrol at night right eye

## 2021-07-05 LAB
BACTERIA SPEC CULT: NO GROWTH
SPECIMEN SOURCE: NORMAL

## 2021-07-19 ENCOUNTER — TELEPHONE (OUTPATIENT)
Dept: OPHTHALMOLOGY | Facility: CLINIC | Age: 56
End: 2021-07-19

## 2021-07-19 NOTE — TELEPHONE ENCOUNTER
The patient called and wonders if he will be dilated at his POP appointment.  I explained that normally the surgical eye will be dilated.  It is unlikely that the non POP eye will be dilated.

## 2021-07-28 ENCOUNTER — OFFICE VISIT (OUTPATIENT)
Dept: OPHTHALMOLOGY | Facility: CLINIC | Age: 56
End: 2021-07-28
Attending: OPHTHALMOLOGY
Payer: OTHER MISCELLANEOUS

## 2021-07-28 DIAGNOSIS — H26.111 LOCALIZED TRAUMATIC CATARACT OF RIGHT EYE: ICD-10-CM

## 2021-07-28 DIAGNOSIS — Z48.810 AFTERCARE FOLLOWING SURGERY OF A SENSE ORGAN: Primary | ICD-10-CM

## 2021-07-28 DIAGNOSIS — S05.51XD: ICD-10-CM

## 2021-07-28 DIAGNOSIS — S05.30XD: ICD-10-CM

## 2021-07-28 PROCEDURE — 92250 FUNDUS PHOTOGRAPHY W/I&R: CPT | Performed by: OPHTHALMOLOGY

## 2021-07-28 PROCEDURE — 92134 CPTRZ OPH DX IMG PST SGM RTA: CPT | Performed by: OPHTHALMOLOGY

## 2021-07-28 PROCEDURE — G0463 HOSPITAL OUTPT CLINIC VISIT: HCPCS

## 2021-07-28 PROCEDURE — 99207 FUNDUS PHOTOS OU (BOTH EYES): CPT | Mod: 26 | Performed by: OPHTHALMOLOGY

## 2021-07-28 PROCEDURE — 99024 POSTOP FOLLOW-UP VISIT: CPT | Performed by: OPHTHALMOLOGY

## 2021-07-28 RX ORDER — ATROPINE SULFATE 10 MG/ML
1 SOLUTION/ DROPS OPHTHALMIC AT BEDTIME
COMMUNITY

## 2021-07-28 RX ORDER — TIMOLOL 5 MG/ML
1 SOLUTION/ DROPS OPHTHALMIC 2 TIMES DAILY
COMMUNITY

## 2021-07-28 RX ORDER — TRAMADOL HYDROCHLORIDE 50 MG/1
TABLET ORAL 3 TIMES DAILY
COMMUNITY
Start: 2021-07-01

## 2021-07-28 RX ORDER — AMITRIPTYLINE HYDROCHLORIDE 50 MG/1
TABLET ORAL AT BEDTIME
COMMUNITY
Start: 2020-03-30

## 2021-07-28 RX ORDER — DICLOFENAC SODIUM 75 MG/1
TABLET, DELAYED RELEASE ORAL 2 TIMES DAILY
COMMUNITY
Start: 2021-04-30

## 2021-07-28 RX ORDER — OFLOXACIN 3 MG/ML
1 SOLUTION/ DROPS OPHTHALMIC 4 TIMES DAILY
COMMUNITY

## 2021-07-28 RX ORDER — VALSARTAN 80 MG/1
TABLET ORAL DAILY
COMMUNITY
Start: 2021-02-26

## 2021-07-28 RX ORDER — BRIMONIDINE TARTRATE 1 MG/ML
1 SOLUTION/ DROPS OPHTHALMIC 2 TIMES DAILY
COMMUNITY

## 2021-07-28 ASSESSMENT — SLIT LAMP EXAM - LIDS: COMMENTS: NORMAL

## 2021-07-28 ASSESSMENT — REFRACTION_WEARINGRX
OD_SPHERE: -1.00
OS_AXIS: 012
OD_ADD: +2.50
SPECS_TYPE: RX SUNGLASSES
OD_AXIS: 007
OS_SPHERE: -1.00
OS_CYLINDER: +0.25
OS_ADD: +2.50
OD_SPHERE: -1.00
OS_CYLINDER: SPHERE
OD_CYLINDER: +0.50
OS_SPHERE: -0.75
OD_ADD: +2.50
OS_ADD: +2.50
OD_AXIS: 020
OD_CYLINDER: +0.50

## 2021-07-28 ASSESSMENT — CUP TO DISC RATIO: OS_RATIO: 0.45

## 2021-07-28 ASSESSMENT — VISUAL ACUITY
OD_PH_CC: 20/40
CORRECTION_TYPE: GLASSES
OD_CC: 20/250
OS_CC: 20/25
METHOD: SNELLEN - LINEAR
OS_CC+: -1

## 2021-07-28 ASSESSMENT — CONF VISUAL FIELD
OD_NORMAL: 1
OS_NORMAL: 1

## 2021-07-28 ASSESSMENT — EXTERNAL EXAM - RIGHT EYE: OD_EXAM: NORMAL

## 2021-07-28 ASSESSMENT — TONOMETRY
OD_IOP_MMHG: 18
OS_IOP_MMHG: 17
IOP_METHOD: TONOPEN

## 2021-07-28 ASSESSMENT — EXTERNAL EXAM - LEFT EYE: OS_EXAM: NORMAL

## 2021-07-28 NOTE — PATIENT INSTRUCTIONS
Prednisolone (white top) two times a day for 1 week and once a day for another and then stop    Continue brimonidine (pruple top) two times a day right eye     Stop timolol (yellow)  Stop maxifloxacin (beige)  Stop atropine (red)  Stop ointment

## 2021-07-28 NOTE — PROGRESS NOTES
Postoperative day 1 status post Rupture globe repair/IOFB removal/SF6 gas right eye 6/29/21    HPI: Qasim Owusu is a 55 year old patient who presented from [place], [place] after he felt a piece of metal strike his right eye while hammering 5/29/21. He noticed a blurring of vision and presented to local ED, before driving down for surgery.     Interval history: vision improving     Plan:  # s/p Rupture globe repair/IOFB removal/SF6 gas right eye. 6/29/21  # PSC cataract right eye   Doing good   PSC cataract that contributes to his decreased vision   Retina detachment and endophthalmitis precautions were discussed with the patient and was asked to return if any of the those occur    Medications to operative eye:    Prednisolone (white top) two times a day for 1 week and once a day for another and then stop    Continue brimonidine (pruple top) two times a day right eye     Stop timolol (yellow)  Stop maxifloxacin (beige)  Stop atropine (red)  Stop ointment         RTC first week of December for evaluation for surgery    ~~~~~~~~~~~~~~~~~~~~~~~~~~~~~~~~~~   Complete documentation of historical and exam elements from today's encounter can be found in the full encounter summary report (not reduplicated in this progress note).  I personally obtained the chief complaint(s) and history of present illness.  I confirmed and edited as necessary the review of systems, past medical/surgical history, family history, social history, and examination findings as documented by others; and I examined the patient myself.  I personally reviewed the relevant tests, images, and reports as documented above.  I formulated and edited as necessary the assessment and plan and discussed the findings and management plan with the patient and family    Real Leroy MD, PhD  , Vitreoretinal Surgery  Department of Ophthalmology  [facility]

## 2021-07-29 ENCOUNTER — TELEPHONE (OUTPATIENT)
Dept: OPHTHALMOLOGY | Facility: CLINIC | Age: 56
End: 2021-07-29

## 2021-07-29 NOTE — TELEPHONE ENCOUNTER
Have attempted to fax letter requested by patient to number given [phone] but it has not gone through. Attempted to reach patient at [phone] and mailbox is not set up and can't leave a message for patient to verify fax number. Will continue to attempt fax.  Peterson Newton @ [facility] 3:20 PM July 29, 2021       Faxed letter, written by Dr. Andrade, to [place] [employer] today.  Peterson Newton @ [facility] 1:02 PM July 29, 2021       [facility] Call Center    Phone Message    May a detailed message be left on voicemail: yes     Reason for Call: Form or Letter   Type or form/letter needing completion: Form for [employer] detailing Pt's need for new pair of glasses and plan for treatment from now until December. Pt states he was told in clinic that he will most likely need 2-3 pairs of glasses w/ new prescriptions from now until then.   Provider: Dr. Leroy  Date form needed: 07/30/21  Once completed: Fax form to: [place] [employer], #[phone]     Please provide as much detail as possible.     Action Taken: Message routed to:  [facility]    Travel Screening: Not Applicable

## 2021-08-04 ENCOUNTER — TRANSFERRED RECORDS (OUTPATIENT)
Dept: HEALTH INFORMATION MANAGEMENT | Facility: CLINIC | Age: 56
End: 2021-08-04

## 2021-10-29 ENCOUNTER — TELEPHONE (OUTPATIENT)
Dept: OPHTHALMOLOGY | Facility: CLINIC | Age: 56
End: 2021-10-29

## 2021-10-29 NOTE — TELEPHONE ENCOUNTER
Patient is scheduled for surgery with Dr. Real Farrell     Spoke with: Qasim     Date of Surgery: 12/13/21     Location: [facility]:  [address] [place] [address]     Informed patient they will need an adult : Yes     H&P will be completed at: [facility]     COVID testing: [facility]    Post Op scheduled on 12/14 and 12/22     Surgery packet was mailed 10/29     Additional comments: Advised RN will call 1 - 2 business days prior with arrival time and instructions.

## 2021-11-17 DIAGNOSIS — Z11.59 ENCOUNTER FOR SCREENING FOR OTHER VIRAL DISEASES: ICD-10-CM

## 2021-11-19 DIAGNOSIS — S05.30XD: Primary | ICD-10-CM

## 2021-12-07 RX ORDER — CYCLOPENTOLAT/TROPIC/PHENYLEPH 1%-1%-2.5%
1 DROPS (EA) OPHTHALMIC (EYE)
Status: CANCELLED | OUTPATIENT
Start: 2021-12-07

## 2021-12-07 RX ORDER — PROPARACAINE HYDROCHLORIDE 5 MG/ML
1 SOLUTION/ DROPS OPHTHALMIC ONCE
Status: CANCELLED | OUTPATIENT
Start: 2021-12-07 | End: 2021-12-07

## 2021-12-13 ENCOUNTER — HOSPITAL ENCOUNTER (OUTPATIENT)
Facility: AMBULATORY SURGERY CENTER | Age: 56
End: 2021-12-13
Attending: OPHTHALMOLOGY
Payer: OTHER MISCELLANEOUS

## 2021-12-13 DIAGNOSIS — H26.111 LOCALIZED TRAUMATIC CATARACT OF RIGHT EYE: ICD-10-CM

## 2023-02-02 ENCOUNTER — TELEPHONE (OUTPATIENT)
Dept: OPHTHALMOLOGY | Facility: CLINIC | Age: 58
End: 2023-02-02

## 2023-02-02 NOTE — TELEPHONE ENCOUNTER
Called Merly with the Radiology Dept @ [address] [place] [address] in [place], [place]. Explained that CT scan showed a single metallic intraocular FB and that we removed this in surgery. His eye should not preclude him from an MRI.    He apparently has surgical hardware elsewhere that is a contraindication to MRI imaging so no MRI will be performed.    Ramon Kebede MD  [employer] Fellow, PGY6  [facility]

## 2024-11-21 NOTE — NURSING NOTE
Chief Complaints and History of Present Illnesses   Patient presents with     Post Op (Ophthalmology) Right Eye     S/p ruptured globe repair/IOFB removal/SF6 gas right eye 6/29/21     Chief Complaint(s) and History of Present Illness(es)     Post Op (Ophthalmology) Right Eye     Laterality: right eye    Associated symptoms: floaters (right eye).  Negative for eye pain and flashes    Pain scale: 0/10    Comments: S/p ruptured globe repair/IOFB removal/SF6 gas right eye 6/29/21              Comments     Pt reports vision did improve after the surgery a month ago and then came to a plateau ~7-8 days ago  Pt states the eye is sensitive - works in a [employer] and is bothered with the dust and sunlight, etc.    Ocular meds:  Prednisolone four times daily right eye  Ofloxacin (beige cap) four times daily right eye  Atropine () once at night right eye - will miss occasionally 2/2 falling asleep prior to instilling  Brimonidine (purple cap) two times a day right eye  Timolol (yellow cap) two times a day right eye  Maxitrol ointment at night right eye - will sometimes miss 2/2 falling asleep prior to instilling    EDOUARD Guzman 1:34 PM July 28, 2021                  PROVIDER:[TOKEN:[[id number]]]

## (undated) DEVICE — SU ETHILON 8-0 TG175-8 12" 1716G

## (undated) DEVICE — EYE KIT AUTO GAS FOR CONSTELLATION 8065751014

## (undated) DEVICE — SU MERSILENE 5-0 S-14 DA 18" WHITE 1760G

## (undated) DEVICE — EYE PROBE ESU DIATHERMY DSP 25GA 339.21

## (undated) DEVICE — EYE PERFLUORON KIT 5ML 8065900163

## (undated) DEVICE — SU SILK 2-0 TIE 12X30" A305H

## (undated) DEVICE — EYE KNIFE STILETTO VISITEC 1.1MM ANG 45DEG SIDEPORT 376620

## (undated) DEVICE — EYE KNIFE SLIT 3.0MM OASIS PE4830-TL

## (undated) DEVICE — EYE TIP BIPOLAR 18GA ERASER 221250

## (undated) DEVICE — GLOVE PROTEXIS MICRO 6.0  2D73PM60

## (undated) DEVICE — STRAP KNEE/BODY 31143004

## (undated) DEVICE — WIPE INSTRUMENT MEROCEL

## (undated) DEVICE — POSITIONER ARMBOARD FOAM 1PAIR LF FP-ARMB1

## (undated) DEVICE — NDL 19GA 1.5"

## (undated) DEVICE — Device

## (undated) DEVICE — EYE CANN SOFT TIP 23GA FOR VALVED SET 8065149527

## (undated) DEVICE — EYE CANN DUALBORE 25GA 3425

## (undated) DEVICE — EYE GAS ISPAN SF6 PER USE/CC/ML 8065797005

## (undated) DEVICE — BLADE KNIFE BEAVER MICROSHARP GREEN 377515

## (undated) DEVICE — EYE NDL RETROBULBAR ATKINSON 25GA 1.5" 581637

## (undated) DEVICE — TAPE DURAPORE 2"X1.5YD SILK 1538S-2

## (undated) DEVICE — APPLICATORS COTTON TIP 6"X2 STERILE LF C15053-006

## (undated) DEVICE — SYR 03ML LL W/O NDL 309657

## (undated) DEVICE — GLOVE PROTEXIS W/NEU-THERA 7.0  2D73TE70

## (undated) DEVICE — EYE FLUORESCEIN OPHTHALMIC STRIP FLO-GLO 1272111

## (undated) DEVICE — SYR 10ML LL W/O NDL 302995

## (undated) DEVICE — GLOVE PROTEXIS MICRO 7.5  2D73PM75

## (undated) DEVICE — SPECIMEN CONTAINER 5OZ STERILE 2600SA

## (undated) DEVICE — SU MERSILENE 5-0 S-24DA 18" 1761G

## (undated) DEVICE — ESU CORD BIPOLAR GREEN 10-4000

## (undated) DEVICE — TAPE MICROPORE 1"X1.5YD 1530S-1

## (undated) DEVICE — SU PLAIN 6-0 TG140-8 18" 1735G

## (undated) DEVICE — SYR 01ML LL W/O NDL LATEX FREE 309628

## (undated) DEVICE — EYE KNIFE CRESCENT 55DEG 373807

## (undated) DEVICE — EYE DRAPE MICROSCOPE UNIVERSAL (BIOM FULL) 08-MK55140

## (undated) DEVICE — SPONGE SPEAR WECK CEL 6/PKG 0008680

## (undated) DEVICE — PACK VITRECTOMY/RET CUSTOM ASC PQ15VRU12

## (undated) DEVICE — SU VICRYL 8-0 TG140-8DA 12" J548G

## (undated) DEVICE — LINEN TOWEL PACK X5 5464

## (undated) DEVICE — EYE PACK 23GA CONSTELLATION 10,000 CPM PPK9381-02

## (undated) DEVICE — EYE PREP BETADINE 5% SOLUTION 30ML 0065-0411-30

## (undated) DEVICE — PACK CATARACT UMMC

## (undated) DEVICE — EYE PROBE LASER 23GA FLEX RFID 8065751113

## (undated) DEVICE — SYR 05ML LL W/O NDL

## (undated) DEVICE — NDL 30GA 0.5" 305106

## (undated) DEVICE — NDL 18GA 1.5" 305196

## (undated) RX ORDER — FENTANYL CITRATE 50 UG/ML
INJECTION, SOLUTION INTRAMUSCULAR; INTRAVENOUS
Status: DISPENSED
Start: 2021-06-30

## (undated) RX ORDER — ACETAMINOPHEN 325 MG/1
TABLET ORAL
Status: DISPENSED
Start: 2021-06-30

## (undated) RX ORDER — HYDROMORPHONE HYDROCHLORIDE 1 MG/ML
INJECTION, SOLUTION INTRAMUSCULAR; INTRAVENOUS; SUBCUTANEOUS
Status: DISPENSED
Start: 2021-06-30

## (undated) RX ORDER — CYCLOPENTOLAT/TROPIC/PHENYLEPH 1%-1%-2.5%
DROPS (EA) OPHTHALMIC (EYE)
Status: DISPENSED
Start: 2021-06-29

## (undated) RX ORDER — ONDANSETRON 2 MG/ML
INJECTION INTRAMUSCULAR; INTRAVENOUS
Status: DISPENSED
Start: 2021-06-30